# Patient Record
Sex: MALE | Race: NATIVE HAWAIIAN OR OTHER PACIFIC ISLANDER | Employment: FULL TIME | URBAN - METROPOLITAN AREA
[De-identification: names, ages, dates, MRNs, and addresses within clinical notes are randomized per-mention and may not be internally consistent; named-entity substitution may affect disease eponyms.]

---

## 2021-09-05 ENCOUNTER — HOSPITAL ENCOUNTER (OUTPATIENT)
Age: 21
Discharge: HOME | End: 2021-09-05
Payer: COMMERCIAL

## 2021-09-05 VITALS
SYSTOLIC BLOOD PRESSURE: 131 MMHG | WEIGHT: 125 LBS | TEMPERATURE: 99 F | HEIGHT: 65 IN | BODY MASS INDEX: 20.83 KG/M2 | DIASTOLIC BLOOD PRESSURE: 70 MMHG | HEART RATE: 63 BPM | RESPIRATION RATE: 16 BRPM | OXYGEN SATURATION: 96 %

## 2021-09-05 DIAGNOSIS — Z76.0 ENCOUNTER FOR MEDICATION REFILL: Primary | ICD-10-CM

## 2021-09-05 PROCEDURE — 99203 OFFICE O/P NEW LOW 30 MIN: CPT | Performed by: NURSE PRACTITIONER

## 2021-09-05 RX ORDER — METHYLPHENIDATE HYDROCHLORIDE 36 MG/1
TABLET ORAL
COMMUNITY
Start: 2021-08-31 | End: 2021-09-05

## 2021-09-05 RX ORDER — METHYLPHENIDATE HYDROCHLORIDE 36 MG/1
36 TABLET ORAL EVERY MORNING
Qty: 9 TABLET | Refills: 0 | Status: SHIPPED | OUTPATIENT
Start: 2021-09-05 | End: 2021-09-14

## 2021-09-05 NOTE — ED PROVIDER NOTES
Patient Seen in: Immediate [address] [place] [address]      History   Patient presents with:  Medication Request    Stated Complaint: presription refill    HPI/Subjective:   Cande is a 22-year-old male with a history of ADHD.   Presents to immediate care for Device None (Room air)       Current:/70   Pulse 63   Temp 99 °F (37.2 °C) (Temporal)   Resp 16   Ht 165.1 cm (5' 5\")   Wt 56.7 kg   SpO2 96%   BMI 20.80 kg/m²         Physical Exam  Vitals and nursing note reviewed.    Constitutional:       General: [facility] home. 9 tabs of Concerta prescribed. Recommended to take daily. Follow-up with PMD when returning home. Reasons to seek emergent treatment and evaluation reinforced. Patient verbalized understanding, and agreed plan of care.   All ques

## 2021-09-05 NOTE — ED INITIAL ASSESSMENT (HPI)
Pt needs a refill on his Concerta medication. Pt was unable to refill the medication before he left for his trip. Pt has a note from his MD with instructions on what dose he is taking.